# Patient Record
Sex: MALE | Race: WHITE | NOT HISPANIC OR LATINO | URBAN - METROPOLITAN AREA
[De-identification: names, ages, dates, MRNs, and addresses within clinical notes are randomized per-mention and may not be internally consistent; named-entity substitution may affect disease eponyms.]

---

## 2017-08-14 ENCOUNTER — OFFICE (OUTPATIENT)
Dept: URBAN - METROPOLITAN AREA CLINIC 10 | Facility: CLINIC | Age: 32
End: 2017-08-14

## 2017-08-14 VITALS
SYSTOLIC BLOOD PRESSURE: 133 MMHG | HEIGHT: 71 IN | DIASTOLIC BLOOD PRESSURE: 73 MMHG | RESPIRATION RATE: 16 BRPM | HEART RATE: 85 BPM | WEIGHT: 154 LBS

## 2017-08-14 DIAGNOSIS — R10.13 EPIGASTRIC PAIN: ICD-10-CM

## 2017-08-14 DIAGNOSIS — K31.84 GASTROPARESIS: ICD-10-CM

## 2017-08-14 DIAGNOSIS — R68.81 EARLY SATIETY: ICD-10-CM

## 2017-08-14 DIAGNOSIS — R11.0 NAUSEA: ICD-10-CM

## 2017-08-14 PROCEDURE — 99204 OFFICE O/P NEW MOD 45 MIN: CPT | Performed by: INTERNAL MEDICINE

## 2017-08-14 RX ORDER — METOCLOPRAMIDE 5 MG/1
TABLET ORAL
Qty: 42 | Refills: 0 | Status: COMPLETED
Start: 2017-08-14 | End: 2017-09-20

## 2017-08-14 NOTE — SERVICEHPINOTES
Mr. Lawson is 31 years old. He is here for initial visit with chief complaint of abdominal pain and nausea. He describes a viral illness with a great deal of emesis. Since that time he descries nausea that is worse with change in his routine. Seeing food causes nausea. He has not been having emesis or weight loss. He does describe early satiety. He describes epigastric pain that is sharp that is followed by cramping that will last 5 minutes. He only takes levsin PRN (about 1-2 times / month). He describes pain with waking in the morning. No pain with eating. He saw a gastroenterologist about one year ago in [place] for diarrhea and treated with levsin for IBS with good results.

## 2017-08-14 NOTE — SERVICENOTES
If no improvement at follow up will consider further workup. Suggested taking levsin with abdominal pain and see if improvement as well.

## 2017-09-20 ENCOUNTER — OFFICE (OUTPATIENT)
Dept: URBAN - METROPOLITAN AREA CLINIC 10 | Facility: CLINIC | Age: 32
End: 2017-09-20

## 2017-09-20 VITALS
SYSTOLIC BLOOD PRESSURE: 124 MMHG | HEART RATE: 69 BPM | HEIGHT: 71 IN | DIASTOLIC BLOOD PRESSURE: 69 MMHG | WEIGHT: 153 LBS

## 2017-09-20 DIAGNOSIS — R11.0 NAUSEA: ICD-10-CM

## 2017-09-20 DIAGNOSIS — R68.81 EARLY SATIETY: ICD-10-CM

## 2017-09-20 DIAGNOSIS — R19.5 OTHER FECAL ABNORMALITIES: ICD-10-CM

## 2017-09-20 DIAGNOSIS — R10.11 RIGHT UPPER QUADRANT PAIN: ICD-10-CM

## 2017-09-20 LAB
AMYLASE, SERUM: 54 U/L (ref 31–124)
CBC, PLATELET, NO DIFFERENTIAL: HEMATOCRIT: 49.3 % (ref 37.5–51)
CBC, PLATELET, NO DIFFERENTIAL: HEMOGLOBIN: 15.8 G/DL (ref 12.6–17.7)
CBC, PLATELET, NO DIFFERENTIAL: MCH: 30.2 PG (ref 26.6–33)
CBC, PLATELET, NO DIFFERENTIAL: MCHC: 32 G/DL (ref 31.5–35.7)
CBC, PLATELET, NO DIFFERENTIAL: MCV: 94 FL (ref 79–97)
CBC, PLATELET, NO DIFFERENTIAL: NRBC: (no result)
CBC, PLATELET, NO DIFFERENTIAL: PLATELETS: 269 X10E3/UL (ref 150–379)
CBC, PLATELET, NO DIFFERENTIAL: RBC: 5.24 X10E6/UL (ref 4.14–5.8)
CBC, PLATELET, NO DIFFERENTIAL: RDW: 12.7 % (ref 12.3–15.4)
CBC, PLATELET, NO DIFFERENTIAL: WBC: 4 X10E3/UL (ref 3.4–10.8)
HEPATIC FUNCTION PANEL (7): ALBUMIN, SERUM: 5 G/DL (ref 3.5–5.5)
HEPATIC FUNCTION PANEL (7): ALKALINE PHOSPHATASE, S: 51 IU/L (ref 39–117)
HEPATIC FUNCTION PANEL (7): ALT (SGPT): 19 IU/L (ref 0–44)
HEPATIC FUNCTION PANEL (7): AST (SGOT): 23 IU/L (ref 0–40)
HEPATIC FUNCTION PANEL (7): BILIRUBIN, DIRECT: 0.18 MG/DL (ref 0–0.4)
HEPATIC FUNCTION PANEL (7): BILIRUBIN, TOTAL: 0.7 MG/DL (ref 0–1.2)
HEPATIC FUNCTION PANEL (7): PROTEIN, TOTAL, SERUM: 7.5 G/DL (ref 6–8.5)
LIPASE, SERUM: 22 U/L (ref 0–59)

## 2017-09-20 PROCEDURE — 99214 OFFICE O/P EST MOD 30 MIN: CPT

## 2017-09-20 RX ORDER — METOCLOPRAMIDE 5 MG/1
TABLET ORAL
Qty: 42 | Refills: 0 | Status: COMPLETED
Start: 2017-08-14 | End: 2017-09-20

## 2017-09-20 RX ORDER — HYOSCYAMINE SULFATE 0.12 MG/1
0.38 TABLET ORAL
Qty: 90 | Refills: 3 | Status: COMPLETED
End: 2020-01-06

## 2017-09-20 NOTE — SERVICEHPINOTES
Chris Lawson JR   is a   31   year old   male   who is here today for a follow-up visit.   He was last here on   8/14/2017   for a   First Visit   . He is in the office today for    follow up likely post infectious Gastroparesis. He complains of still having nausea randomly throughout the day. Reports right upper abdominal pain and describes as a "burning, sore pain". He describes pain as an injured muscles. Pain is not related to food and last about 10-15 minutes. He does report occasional pain goes to back and up right to right shoulder. He has tried jessica tea in the past but nothing makes better. Denies having heartburn or reflux symptoms. He does report having early satiety with no appetite. Weight is down a few pounds in last few weeks. Stools  are regular and "light colored". No melena or hematochechzia.  He is not taking Levsin because feels like IBS symptoms are controlled. No reglan because did not feel like it worked, and stopped taking prevacid about a month ago. He doesn't feel like he has heartburn or reflux symptoms. BR

## 2020-01-06 ENCOUNTER — OFFICE (OUTPATIENT)
Dept: URBAN - METROPOLITAN AREA CLINIC 10 | Facility: CLINIC | Age: 35
End: 2020-01-06

## 2020-01-06 VITALS
WEIGHT: 179 LBS | HEART RATE: 80 BPM | SYSTOLIC BLOOD PRESSURE: 127 MMHG | HEIGHT: 71 IN | DIASTOLIC BLOOD PRESSURE: 82 MMHG

## 2020-01-06 DIAGNOSIS — R10.13 EPIGASTRIC PAIN: ICD-10-CM

## 2020-01-06 DIAGNOSIS — R14.0 ABDOMINAL DISTENSION (GASEOUS): ICD-10-CM

## 2020-01-06 DIAGNOSIS — K31.84 GASTROPARESIS: ICD-10-CM

## 2020-01-06 DIAGNOSIS — R11.0 NAUSEA: ICD-10-CM

## 2020-01-06 PROCEDURE — 99213 OFFICE O/P EST LOW 20 MIN: CPT | Performed by: INTERNAL MEDICINE

## 2020-01-06 RX ORDER — HYOSCYAMINE SULFATE 0.12 MG/1
TABLET ORAL; SUBLINGUAL
Qty: 90 | Refills: 3 | Status: COMPLETED
Start: 2020-01-06 | End: 2020-11-03

## 2020-01-06 NOTE — SERVICENOTES
Discussed with patient that probably would not be able to prescribed domperidone long-term as it is not FDA approved.  Will review Dr. cam coreas records.  I asked him about stopping Prevacid for H pylori testing; however, he states his abdominal pain becomes too intense if he stops his Prevacid.

## 2020-01-06 NOTE — SERVICEHPINOTES
Mr. Lawson is 34 years old. He is here after being lost to follow up with chief complaint of He is currently taking domperidone given to him by Dr. Huynh. it appears he was diagnosed with gastroparesis based on upper endoscopy subjectively.  He describes constant nausea that is improved with a combination of domperidone and Remeron.  He takes Remeron PRN.  He denies any vomiting.  He does complain of epigastric pain that is sharp and nonradiating that is better with eating and worse if he does not eat.  He denies weight loss.  He has gained 20 pound since last visit.He has history of post infectious [id number]-abdominal ultrasound: Normal.BR-CBC normal. LFTs normal. Amylase normal. Lipase normal.

## 2020-01-10 ENCOUNTER — AMBULATORY SURGICAL CENTER (OUTPATIENT)
Dept: URBAN - METROPOLITAN AREA SURGERY 1 | Facility: SURGERY | Age: 35
End: 2020-01-10

## 2020-01-10 ENCOUNTER — OFFICE (OUTPATIENT)
Dept: URBAN - METROPOLITAN AREA PATHOLOGY 22 | Facility: PATHOLOGY | Age: 35
End: 2020-01-10
Payer: COMMERCIAL

## 2020-01-10 VITALS
HEART RATE: 73 BPM | WEIGHT: 177 LBS | DIASTOLIC BLOOD PRESSURE: 77 MMHG | RESPIRATION RATE: 17 BRPM | OXYGEN SATURATION: 97 % | HEART RATE: 86 BPM | HEART RATE: 73 BPM | SYSTOLIC BLOOD PRESSURE: 136 MMHG | RESPIRATION RATE: 16 BRPM | TEMPERATURE: 97.7 F | OXYGEN SATURATION: 100 % | DIASTOLIC BLOOD PRESSURE: 94 MMHG | DIASTOLIC BLOOD PRESSURE: 66 MMHG | HEART RATE: 86 BPM | SYSTOLIC BLOOD PRESSURE: 139 MMHG | TEMPERATURE: 98.6 F | OXYGEN SATURATION: 99 % | SYSTOLIC BLOOD PRESSURE: 115 MMHG | SYSTOLIC BLOOD PRESSURE: 143 MMHG | SYSTOLIC BLOOD PRESSURE: 115 MMHG | SYSTOLIC BLOOD PRESSURE: 143 MMHG | DIASTOLIC BLOOD PRESSURE: 77 MMHG | RESPIRATION RATE: 17 BRPM | TEMPERATURE: 97.7 F | DIASTOLIC BLOOD PRESSURE: 89 MMHG | HEIGHT: 71 IN | OXYGEN SATURATION: 98 % | SYSTOLIC BLOOD PRESSURE: 136 MMHG | OXYGEN SATURATION: 97 % | HEART RATE: 69 BPM | DIASTOLIC BLOOD PRESSURE: 85 MMHG | HEART RATE: 76 BPM | OXYGEN SATURATION: 99 % | RESPIRATION RATE: 16 BRPM | SYSTOLIC BLOOD PRESSURE: 136 MMHG | HEART RATE: 64 BPM | OXYGEN SATURATION: 100 % | OXYGEN SATURATION: 97 % | RESPIRATION RATE: 16 BRPM | HEIGHT: 71 IN | HEART RATE: 76 BPM | DIASTOLIC BLOOD PRESSURE: 76 MMHG | HEART RATE: 73 BPM | HEART RATE: 69 BPM | WEIGHT: 177 LBS | SYSTOLIC BLOOD PRESSURE: 133 MMHG | SYSTOLIC BLOOD PRESSURE: 139 MMHG | HEART RATE: 76 BPM | HEART RATE: 74 BPM | OXYGEN SATURATION: 98 % | DIASTOLIC BLOOD PRESSURE: 76 MMHG | RESPIRATION RATE: 17 BRPM | SYSTOLIC BLOOD PRESSURE: 133 MMHG | DIASTOLIC BLOOD PRESSURE: 85 MMHG | SYSTOLIC BLOOD PRESSURE: 115 MMHG | SYSTOLIC BLOOD PRESSURE: 108 MMHG | SYSTOLIC BLOOD PRESSURE: 108 MMHG | HEART RATE: 86 BPM | HEART RATE: 64 BPM | TEMPERATURE: 98.6 F | DIASTOLIC BLOOD PRESSURE: 66 MMHG | DIASTOLIC BLOOD PRESSURE: 66 MMHG | OXYGEN SATURATION: 98 % | OXYGEN SATURATION: 99 % | HEART RATE: 74 BPM | SYSTOLIC BLOOD PRESSURE: 143 MMHG | TEMPERATURE: 97.7 F | SYSTOLIC BLOOD PRESSURE: 133 MMHG | HEIGHT: 71 IN | SYSTOLIC BLOOD PRESSURE: 139 MMHG | SYSTOLIC BLOOD PRESSURE: 108 MMHG | HEART RATE: 69 BPM | DIASTOLIC BLOOD PRESSURE: 94 MMHG | DIASTOLIC BLOOD PRESSURE: 89 MMHG | DIASTOLIC BLOOD PRESSURE: 76 MMHG | DIASTOLIC BLOOD PRESSURE: 77 MMHG | TEMPERATURE: 98.6 F | DIASTOLIC BLOOD PRESSURE: 94 MMHG | HEART RATE: 74 BPM | DIASTOLIC BLOOD PRESSURE: 89 MMHG | HEART RATE: 64 BPM | DIASTOLIC BLOOD PRESSURE: 85 MMHG | OXYGEN SATURATION: 100 % | WEIGHT: 177 LBS

## 2020-01-10 DIAGNOSIS — R10.13 EPIGASTRIC PAIN: ICD-10-CM

## 2020-01-10 DIAGNOSIS — K31.89 OTHER DISEASES OF STOMACH AND DUODENUM: ICD-10-CM

## 2020-01-10 DIAGNOSIS — K29.50 UNSPECIFIED CHRONIC GASTRITIS WITHOUT BLEEDING: ICD-10-CM

## 2020-01-10 PROBLEM — K22.70 BARRETT'S ESOPHAGUS WITHOUT DYSPLASIA: Status: ACTIVE | Noted: 2020-01-10

## 2020-01-10 PROCEDURE — 88313 SPECIAL STAINS GROUP 2: CPT | Performed by: INTERNAL MEDICINE

## 2020-01-10 PROCEDURE — 43239 EGD BIOPSY SINGLE/MULTIPLE: CPT | Performed by: INTERNAL MEDICINE

## 2020-01-10 PROCEDURE — 88342 IMHCHEM/IMCYTCHM 1ST ANTB: CPT | Performed by: INTERNAL MEDICINE

## 2020-01-10 PROCEDURE — 88305 TISSUE EXAM BY PATHOLOGIST: CPT | Performed by: INTERNAL MEDICINE

## 2020-01-10 PROCEDURE — 88341 IMHCHEM/IMCYTCHM EA ADD ANTB: CPT | Performed by: INTERNAL MEDICINE

## 2020-11-03 ENCOUNTER — OFFICE (OUTPATIENT)
Dept: URBAN - METROPOLITAN AREA CLINIC 10 | Facility: CLINIC | Age: 35
End: 2020-11-03

## 2020-11-03 VITALS
HEART RATE: 76 BPM | HEIGHT: 71 IN | WEIGHT: 185 LBS | DIASTOLIC BLOOD PRESSURE: 80 MMHG | SYSTOLIC BLOOD PRESSURE: 116 MMHG

## 2020-11-03 DIAGNOSIS — K21.9 GASTRO-ESOPHAGEAL REFLUX DISEASE WITHOUT ESOPHAGITIS: ICD-10-CM

## 2020-11-03 DIAGNOSIS — K30 FUNCTIONAL DYSPEPSIA: ICD-10-CM

## 2020-11-03 PROCEDURE — 99214 OFFICE O/P EST MOD 30 MIN: CPT | Performed by: INTERNAL MEDICINE

## 2022-10-25 NOTE — SERVICEHPINOTES
Mr. Lawson is 34 years old. He is here for follow up of functional dyspepsia that has now improved with remeron 7.5mg and prevacid 15mg once daily.  He complains more of heartburn reflux today and is interested in LINX procedure for reflux.  He states he does not take his Prevacid 15 mg once daily he will have heartburn.  He also complains of return of gastric contents to mouth particularly if he lays on his right side. He also complains feeling of water stopping in the middle of his chest when he drinks.  He denies weight loss or hematemesis.  He is concerned about taking Prevacid long-term.He is currently taking domperidone given to him by Dr. Huynh. It appears he was diagnosed with gastroparesis based on upper endoscopy subjectively. He describes constant nausea that is improved with a combination of domperidone and Remeron. He takes Remeron PRN. He denies any vomiting. He does complain of epigastric pain that is sharp and nonradiating that is better with eating and worse if he does not eat. He denies weight loss. He has gained 20 pound since last visit.He has history of post infectious [id number]:garcía's appearing mucosa (1cm) that was biopsy negative. Reactive antral gastropathy negative for h.pylori. Normal duodenum with normal biopsies. 12/16/2019- CBC [id number]-abdominal ultrasound: Normal.BR-CBC normal. LFTs normal. Amylase normal. Lipase normal. Alert-The patient is alert, awake and responds to voice. The patient is oriented to time, place, and person. The triage nurse is able to obtain subjective information.